# Patient Record
Sex: MALE | Race: OTHER | Employment: FULL TIME | URBAN - METROPOLITAN AREA
[De-identification: names, ages, dates, MRNs, and addresses within clinical notes are randomized per-mention and may not be internally consistent; named-entity substitution may affect disease eponyms.]

---

## 2017-09-21 ENCOUNTER — HOSPITAL ENCOUNTER (EMERGENCY)
Facility: MEDICAL CENTER | Age: 22
End: 2017-09-21
Attending: EMERGENCY MEDICINE
Payer: MEDICAID

## 2017-09-21 VITALS
SYSTOLIC BLOOD PRESSURE: 131 MMHG | DIASTOLIC BLOOD PRESSURE: 84 MMHG | HEIGHT: 60 IN | WEIGHT: 188.49 LBS | RESPIRATION RATE: 16 BRPM | TEMPERATURE: 97.4 F | OXYGEN SATURATION: 96 % | HEART RATE: 60 BPM | BODY MASS INDEX: 37.01 KG/M2

## 2017-09-21 DIAGNOSIS — T16.2XXA EAR FOREIGN BODY, LEFT, INITIAL ENCOUNTER: ICD-10-CM

## 2017-09-21 PROCEDURE — 69200 CLEAR OUTER EAR CANAL: CPT

## 2017-09-21 PROCEDURE — 99283 EMERGENCY DEPT VISIT LOW MDM: CPT

## 2017-09-21 ASSESSMENT — PAIN SCALES - GENERAL: PAINLEVEL_OUTOF10: 0

## 2017-09-22 NOTE — DISCHARGE INSTRUCTIONS
Ear Foreign Body  An ear foreign body is an object that is stuck in your ear. The object is usually stuck in the ear canal.  CAUSES  In all ages of people, the most common foreign bodies are insects that enter the ear canal. It is common for young children to put objects into the ear canal. These may include luisana, beads, parts of toys, and any other small objects that fit into the ear. In adults, objects such as cotton swabs may become lodged in the ear canal.   SIGNS AND SYMPTOMS  A foreign body in the ear may cause:  · Pain.  · Buzzing or roaring sounds.  · Hearing loss.  · Ear drainage or bleeding.  · Nausea and vomiting.  · A feeling that your ear is full.  DIAGNOSIS  Your health care provider may be able to diagnose an ear foreign body based on the information that you provide, your symptoms, and a physical exam. Your health care provider may also perform tests, such as testing your hearing and your ear pressure, to check for infection or other problems that are caused by the foreign body in your ear.  TREATMENT  Treatment depends on what the foreign body is, the location of the foreign body in your ear, and whether or not the foreign body has injured any part of your inner ear. If the foreign body is visible to your health care provider, it may be possible to remove the foreign body using:  · A tool, such as medical tweezers (forceps) or a suction tube (catheter).  · Irrigation. This uses water to flush the foreign body out of your ear. This is used only if the foreign body is not likely to swell or enlarge when it is put in water.  If the foreign body is not visible or your health care provider was not able to remove the foreign body, you may be referred to a specialist for removal. You may also be prescribed antibiotic medicine or ear drops to prevent infection. If the foreign body has caused injury to other parts of your ear, you may need additional treatment.  HOME CARE INSTRUCTIONS  · Keep all  follow-up visits as directed by your health care provider. This is important.  · Take medicines only as directed by your health care provider.  · If you were prescribed an antibiotic medicine, finish it all even if you start to feel better.  PREVENTION  · Keep small objects out of reach of young children. Tell children not to put anything in their ears.  · Do not put anything in your ear, including cotton swabs, to clean your ears. Talk to your health care provider about how to clean your ears safely.  SEEK MEDICAL CARE IF:  · You have a headache.  · Your have blood coming from your ear.  · You have a fever.  · You have increased pain or swelling of your ear.  · Your hearing is reduced.  · You have discharge coming from your ear.     This information is not intended to replace advice given to you by your health care provider. Make sure you discuss any questions you have with your health care provider.     Document Released: 12/15/2001 Document Revised: 01/08/2016 Document Reviewed: 08/03/2015  [email or web address] Interactive Patient Education ©2016 [employer]

## 2017-09-22 NOTE — ED PROVIDER NOTES
ED Provider Note    CHIEF COMPLAINT  Chief Complaint   Patient presents with   • Foreign Body in Ear       HPI  Magdaleno Bear is a 22 y.o. Male, otherwise healthy, who presents with foreign body in the left ear.  Patient states he was cleaning his ears with Q-tips just prior to arrival when he believes part or all of the tip became lodged in his ear canal. He reports some associated discomfort without significant pain. No recent fevers or infectious symptoms. No discharge or bleeding from the ear. No other complaints at this time.      REVIEW OF SYSTEMS  See HPI for further details. All other systems are negative.     PAST MEDICAL HISTORY   has a past medical history of Fracture.    SOCIAL HISTORY  Social History     Social History Main Topics   • Smoking status: Never Smoker   • Smokeless tobacco: Not on file   • Alcohol use No   • Drug use: No   • Sexual activity: Not on file       SURGICAL HISTORY   has a past surgical history that includes tibia orif (3/28/2011).    CURRENT MEDICATIONS  Home Medications    **Home medications have not yet been reviewed for this encounter**         ALLERGIES  No Known Allergies    PHYSICAL EXAM  VITAL SIGNS: /84   Pulse 60   Temp 36.3 °C (97.4 °F)   Resp 16   Ht 1.524 m (5')   Wt 85.5 kg (188 lb 7.9 oz)   SpO2 96%   BMI 36.81 kg/m²    Pulse ox interpretation: I interpret this pulse ox as normal.  Constitutional: Alert in no apparent distress.  HENT: Normocephalic, Atraumatic, Bilateral external ears normal. Nose normal.   Right TM visualized and normal, left TM with obvious foreign body blocking visualization of the TM, no obvious trauma or bleeding  Eyes: Pupils are equal and reactive. Conjunctiva normal, non-icteric.   Heart: Regular rate and rhythm, no murmurs.    Lungs: Clear to auscultation bilaterally.  Skin: Warm, Dry, No erythema, No rash.   Neurologic: Alert, Grossly non-focal.   Psychiatric: Affect normal, Judgment normal, Mood normal, Appears appropriate and  not intoxicated.       PROCEDURE:  Foreign Body Removal Procedure Note    Indication: retained foreign body    Procedure:   Foreign body removed from left ear with alligator forceps.  On reexamination, there is no retained foreign body or evidence of bleeding or trauma.    The patient tolerated the procedure well.    Complications: None          ED COURSE & MEDICAL DECISION MAKING    /84   Pulse 60   Temp 36.3 °C (97.4 °F)   Resp 16   Ht 1.524 m (5')   Wt 85.5 kg (188 lb 7.9 oz)   SpO2 96%   BMI 36.81 kg/m²     Medications administered:  Medications - No data to display      Labs and imaging personally reviewed:    Labs Reviewed - No data to display    No orders to display       MDM:  Otherwise healthy patient presents with foreign body to the left ear. Afebrile with normal vitals on arrival. No other medical complaints. Large tip of Qtip removed with alligator forceps without difficulty. No evidence of underlying infection including acute otitis media, otitis externa on exam. No evidence of trauma or TM perforation. Plan for discharge home. Patient instructed on avoidance of Q-tips in the ear.      IMPRESSION  (T16.2XXA) Ear foreign body, left, initial encounter    Disposition: Discharge home, good condition  Results, diagnoses, and treatment options were discussed with the patient and/or family. Patient verbalized understanding of plan of care and strict return precautions prior to discharge.    Discharge Medication List as of 9/21/2017 10:40 PM            Electronically signed by: Megan Orozco, 9/21/2017 10:30 PM

## 2018-06-26 ENCOUNTER — NON-PROVIDER VISIT (OUTPATIENT)
Dept: URGENT CARE | Facility: CLINIC | Age: 23
End: 2018-06-26

## 2018-06-26 DIAGNOSIS — Z02.1 PRE-EMPLOYMENT DRUG SCREENING: ICD-10-CM

## 2018-06-26 LAB
AMP AMPHETAMINE: NORMAL
COC COCAINE: NORMAL
INT CON NEG: NEGATIVE
INT CON POS: POSITIVE
MET METHAMPHETAMINES: NORMAL
OPI OPIATES: NORMAL
PCP PHENCYCLIDINE: NORMAL
POC DRUG COMMENT 753798-OCCUPATIONAL HEALTH: NEGATIVE
THC: NORMAL

## 2018-06-26 PROCEDURE — 80305 DRUG TEST PRSMV DIR OPT OBS: CPT | Performed by: NURSE PRACTITIONER

## 2020-06-25 ENCOUNTER — NON-PROVIDER VISIT (OUTPATIENT)
Dept: URGENT CARE | Facility: PHYSICIAN GROUP | Age: 25
End: 2020-06-25

## 2020-06-25 DIAGNOSIS — Z02.1 PRE-EMPLOYMENT DRUG SCREENING: ICD-10-CM

## 2020-06-25 LAB
AMP AMPHETAMINE: NEGATIVE
COC COCAINE: NEGATIVE
INT CON NEG: NORMAL
INT CON POS: NORMAL
MET METHAMPHETAMINES: NEGATIVE
OPI OPIATES: NEGATIVE
PCP PHENCYCLIDINE: NEGATIVE
POC DRUG COMMENT 753798-OCCUPATIONAL HEALTH: NEGATIVE
THC: NEGATIVE

## 2020-06-25 PROCEDURE — 80305 DRUG TEST PRSMV DIR OPT OBS: CPT | Performed by: PHYSICIAN ASSISTANT

## 2022-09-13 ENCOUNTER — OFFICE VISIT (OUTPATIENT)
Dept: URGENT CARE | Facility: PHYSICIAN GROUP | Age: 27
End: 2022-09-13
Payer: COMMERCIAL

## 2022-09-13 VITALS
SYSTOLIC BLOOD PRESSURE: 132 MMHG | TEMPERATURE: 99.1 F | BODY MASS INDEX: 40.52 KG/M2 | HEART RATE: 91 BPM | WEIGHT: 220.2 LBS | RESPIRATION RATE: 20 BRPM | DIASTOLIC BLOOD PRESSURE: 96 MMHG | OXYGEN SATURATION: 97 % | HEIGHT: 62 IN

## 2022-09-13 DIAGNOSIS — S76.219A INGUINAL STRAIN, INITIAL ENCOUNTER: ICD-10-CM

## 2022-09-13 LAB
APPEARANCE UR: CLEAR
BILIRUB UR STRIP-MCNC: NEGATIVE MG/DL
COLOR UR AUTO: YELLOW
GLUCOSE UR STRIP.AUTO-MCNC: NEGATIVE MG/DL
KETONES UR STRIP.AUTO-MCNC: NEGATIVE MG/DL
LEUKOCYTE ESTERASE UR QL STRIP.AUTO: NEGATIVE
NITRITE UR QL STRIP.AUTO: NEGATIVE
PH UR STRIP.AUTO: 6.5 [PH] (ref 5–8)
PROT UR QL STRIP: NEGATIVE MG/DL
RBC UR QL AUTO: NEGATIVE
SP GR UR STRIP.AUTO: 1.02
UROBILINOGEN UR STRIP-MCNC: 0.2 MG/DL

## 2022-09-13 PROCEDURE — 99203 OFFICE O/P NEW LOW 30 MIN: CPT | Performed by: PHYSICIAN ASSISTANT

## 2022-09-13 PROCEDURE — 81002 URINALYSIS NONAUTO W/O SCOPE: CPT | Performed by: PHYSICIAN ASSISTANT

## 2022-09-13 ASSESSMENT — ENCOUNTER SYMPTOMS
HEADACHES: 0
MYALGIAS: 0
DIZZINESS: 0
CHILLS: 0
FEVER: 0
TINGLING: 0
ABDOMINAL PAIN: 0
FLANK PAIN: 0
FALLS: 0
NAUSEA: 0
VOMITING: 0

## 2022-09-13 NOTE — PROGRESS NOTES
Subjective:     CHIEF COMPLAINT  Chief Complaint   Patient presents with    Leg Pain     Pain at top of left leg, swollen, pain is increasing, started this morning, no known injury       HPI  Magdaleno Bear is a very pleasant 27 y.o. male who presents to the clinic with left-sided inguinal pain x3 hours.  Patient states the pain started abruptly.  Denies any preceding injury or trauma.  Pain is currently fairly mild rated as a 3/10.  Certain movements seem to aggravate the pain.  The pain does not radiate.  He denies any associated testicular pain, scrotal swelling, dysuria, penile discharge, rash or lesion.  States he did workout yesterday however denies any new or strenuous activities.  He has concern for potential hernia.  No prior hernia or abdominal surgery.  States he is having normal bowel movements.  No nausea, vomiting, fevers or chills.  He is not sexually active.  Started any OTC medications at this time for symptoms.    REVIEW OF SYSTEMS  Review of Systems   Constitutional:  Negative for chills, fever and malaise/fatigue.   Gastrointestinal:  Negative for abdominal pain, nausea and vomiting.   Genitourinary:  Negative for dysuria, flank pain, frequency, hematuria and urgency.        Left-sided groin pain.  No testicular pain or swelling.   Musculoskeletal:  Negative for falls and myalgias.   Skin:  Negative for rash.   Neurological:  Negative for dizziness, tingling and headaches.     PAST MEDICAL HISTORY  There are no problems to display for this patient.      SURGICAL HISTORY   has a past surgical history that includes orif, fracture, tibia (3/28/2011).    ALLERGIES  No Known Allergies    CURRENT MEDICATIONS  Home Medications       Reviewed by Mikel De Santiago P.A.-C. (Physician Assistant) on 09/13/22 at 1319  Med List Status: <None>     Medication Last Dose Status   acetaminophen (TYLENOL) 325 MG Tab Not Taking Active   diphenhydrAMINE (BENADRYL) 25 MG capsule Not Taking Active   hydrocodone-acetaminophen  "(VICODIN) 5-500 MG Tab Not Taking Active   sennosides (SENOKOT) 8.6 MG Tab Not Taking Active                    SOCIAL HISTORY  Social History     Tobacco Use    Smoking status: Never    Smokeless tobacco: Never   Vaping Use    Vaping Use: Never used   Substance and Sexual Activity    Alcohol use: No    Drug use: No    Sexual activity: Not on file       FAMILY HISTORY  History reviewed. No pertinent family history.       Objective:     VITAL SIGNS: BP (!) 132/96 (BP Location: Left arm, Patient Position: Sitting, BP Cuff Size: Adult)   Pulse 91   Temp 37.3 °C (99.1 °F) (Temporal)   Resp 20   Ht 1.575 m (5' 2\")   Wt 99.9 kg (220 lb 3.2 oz)   SpO2 97%   BMI 40.28 kg/m²     PHYSICAL EXAM  Physical Exam  Constitutional:       Appearance: Normal appearance.   HENT:      Head: Normocephalic and atraumatic.   Eyes:      Conjunctiva/sclera: Conjunctivae normal.   Pulmonary:      Effort: Pulmonary effort is normal.   Abdominal:      Hernia: There is no hernia in the left inguinal area or right inguinal area.   Genitourinary:     Pubic Area: No rash.       Penis: Normal.       Testes: Normal. Cremasteric reflex is present.         Right: Mass or tenderness not present.         Left: Mass or tenderness not present.      Epididymis:      Right: Normal.      Left: Normal.   Musculoskeletal:      Cervical back: Normal range of motion.   Lymphadenopathy:      Lower Body: No right inguinal adenopathy. No left inguinal adenopathy.   Skin:     Capillary Refill: Capillary refill takes less than 2 seconds.   Neurological:      General: No focal deficit present.      Mental Status: He is alert and oriented to person, place, and time. Mental status is at baseline.       Assessment/Plan:     1. Inguinal strain, initial encounter    - POCT Urinalysis  - US-INGUINAL HERNIA; Future    MDM/Comments:    Differential diagnoses and treatment options eliu with patient at length today.  Patient has been experiencing left inguinal pain x3 " hours.  Pain is fairly mild at this time.  Aggravated with certain movements.  Denies any preceding injury or trauma.  No concern for STI.  Urinalysis performed without any signs of infection or blood.  No palpable hernia appreciated.  Discussed possible inguinal hernia versus muscle strain.  No signs of testicular torsion.  We will send out for outpatient imaging.  I will follow-up once available.  Supportive recommendations discussed.    Differential diagnosis, natural history, supportive care, and indications for immediate follow-up discussed. All questions answered. Patient agrees with the plan of care.    Follow-up as needed if symptoms worsen or fail to improve to PCP, Urgent care or Emergency Room.    I have personally reviewed prior external notes and test results pertinent to today's visit.  I have independently reviewed and interpreted all diagnostics ordered during this urgent care acute visit.   Discussed management options (risks,benefits, and alternatives to treatment). Pt expresses understanding and the treatment plan was agreed upon. Questions were encouraged and answered to pt's satisfaction.    Please note that this dictation was created using voice recognition software. I have made a reasonable attempt to correct obvious errors, but I expect that there are errors of grammar and possibly content that I did not discover before finalizing the note.

## 2022-09-13 NOTE — LETTER
[facility] [place]  [address]  [place] [address]     September 13, 2022    Patient: Magdaleno Bear   YOB: 1995   Date of Visit: 9/13/2022       To Whom It May Concern:    Magdaleno Bear was seen and treated in our department on 9/13/2022.  Please excuse the patient's absence from work on 9/13/2022.  Cleared to return to work on 9/14/2022.    Sincerely,     Mikel De Santiago P.A.-C.

## 2022-09-15 ENCOUNTER — HOSPITAL ENCOUNTER (OUTPATIENT)
Dept: RADIOLOGY | Facility: MEDICAL CENTER | Age: 27
End: 2022-09-15
Attending: PHYSICIAN ASSISTANT
Payer: COMMERCIAL

## 2022-09-15 DIAGNOSIS — S76.219A INGUINAL STRAIN, INITIAL ENCOUNTER: ICD-10-CM

## 2022-09-15 PROCEDURE — 76857 US EXAM PELVIC LIMITED: CPT

## 2022-09-16 ENCOUNTER — TELEPHONE (OUTPATIENT)
Dept: URGENT CARE | Facility: CLINIC | Age: 27
End: 2022-09-16
Payer: COMMERCIAL

## 2022-09-16 NOTE — TELEPHONE ENCOUNTER
Tried calling the patient multiple times to discuss his ultrasound findings.  Left voicemail for the patient to give me a call back when he has availability and I am happy to discuss his results.

## 2025-08-09 ENCOUNTER — OCCUPATIONAL MEDICINE (OUTPATIENT)
Dept: URGENT CARE | Facility: CLINIC | Age: 30
End: 2025-08-09
Payer: COMMERCIAL

## 2025-08-09 VITALS
RESPIRATION RATE: 18 BRPM | BODY MASS INDEX: 41.11 KG/M2 | HEART RATE: 64 BPM | DIASTOLIC BLOOD PRESSURE: 74 MMHG | OXYGEN SATURATION: 96 % | WEIGHT: 232 LBS | SYSTOLIC BLOOD PRESSURE: 118 MMHG | TEMPERATURE: 97.8 F | HEIGHT: 63 IN

## 2025-08-09 DIAGNOSIS — S00.03XA CONTUSION OF SCALP, INITIAL ENCOUNTER: ICD-10-CM

## 2025-08-09 DIAGNOSIS — S09.90XA CLOSED HEAD INJURY, INITIAL ENCOUNTER: Primary | ICD-10-CM

## 2025-08-09 PROCEDURE — 3074F SYST BP LT 130 MM HG: CPT | Performed by: STUDENT IN AN ORGANIZED HEALTH CARE EDUCATION/TRAINING PROGRAM

## 2025-08-09 PROCEDURE — 99214 OFFICE O/P EST MOD 30 MIN: CPT | Mod: 25 | Performed by: STUDENT IN AN ORGANIZED HEALTH CARE EDUCATION/TRAINING PROGRAM

## 2025-08-09 PROCEDURE — 3078F DIAST BP <80 MM HG: CPT | Performed by: STUDENT IN AN ORGANIZED HEALTH CARE EDUCATION/TRAINING PROGRAM

## 2025-08-09 RX ORDER — KETOROLAC TROMETHAMINE 15 MG/ML
15 INJECTION, SOLUTION INTRAMUSCULAR; INTRAVENOUS ONCE
Status: COMPLETED | OUTPATIENT
Start: 2025-08-09 | End: 2025-08-09

## 2025-08-09 RX ADMIN — KETOROLAC TROMETHAMINE 15 MG: 15 INJECTION, SOLUTION INTRAMUSCULAR; INTRAVENOUS at 19:43

## 2025-08-11 ENCOUNTER — OCCUPATIONAL MEDICINE (OUTPATIENT)
Dept: URGENT CARE | Facility: CLINIC | Age: 30
End: 2025-08-11
Payer: COMMERCIAL

## 2025-08-11 VITALS
OXYGEN SATURATION: 97 % | TEMPERATURE: 98.5 F | HEIGHT: 63 IN | DIASTOLIC BLOOD PRESSURE: 68 MMHG | SYSTOLIC BLOOD PRESSURE: 120 MMHG | HEART RATE: 81 BPM | WEIGHT: 232 LBS | BODY MASS INDEX: 41.11 KG/M2 | RESPIRATION RATE: 18 BRPM

## 2025-08-11 DIAGNOSIS — S09.90XD CHI (CLOSED HEAD INJURY), SUBSEQUENT ENCOUNTER: Primary | ICD-10-CM

## 2025-08-11 PROCEDURE — 99213 OFFICE O/P EST LOW 20 MIN: CPT | Performed by: STUDENT IN AN ORGANIZED HEALTH CARE EDUCATION/TRAINING PROGRAM

## 2025-08-11 PROCEDURE — 3078F DIAST BP <80 MM HG: CPT | Performed by: STUDENT IN AN ORGANIZED HEALTH CARE EDUCATION/TRAINING PROGRAM

## 2025-08-11 PROCEDURE — 3074F SYST BP LT 130 MM HG: CPT | Performed by: STUDENT IN AN ORGANIZED HEALTH CARE EDUCATION/TRAINING PROGRAM

## 2025-08-14 ENCOUNTER — OCCUPATIONAL MEDICINE (OUTPATIENT)
Dept: URGENT CARE | Facility: CLINIC | Age: 30
End: 2025-08-14
Payer: COMMERCIAL

## 2025-08-14 VITALS
TEMPERATURE: 97.5 F | WEIGHT: 232 LBS | HEART RATE: 81 BPM | SYSTOLIC BLOOD PRESSURE: 126 MMHG | RESPIRATION RATE: 18 BRPM | BODY MASS INDEX: 41.11 KG/M2 | DIASTOLIC BLOOD PRESSURE: 88 MMHG | HEIGHT: 63 IN | OXYGEN SATURATION: 95 %

## 2025-08-14 DIAGNOSIS — S09.90XD CLOSED HEAD INJURY, SUBSEQUENT ENCOUNTER: Primary | ICD-10-CM

## 2025-08-14 DIAGNOSIS — S06.0X0D CONCUSSION WITHOUT LOSS OF CONSCIOUSNESS, SUBSEQUENT ENCOUNTER: ICD-10-CM

## 2025-08-14 DIAGNOSIS — M54.2 NECK PAIN: ICD-10-CM

## 2025-08-14 DIAGNOSIS — R51.9 ACUTE NONINTRACTABLE HEADACHE, UNSPECIFIED HEADACHE TYPE: ICD-10-CM

## 2025-08-14 PROCEDURE — 3079F DIAST BP 80-89 MM HG: CPT | Performed by: NURSE PRACTITIONER

## 2025-08-14 PROCEDURE — 3074F SYST BP LT 130 MM HG: CPT | Performed by: NURSE PRACTITIONER

## 2025-08-14 PROCEDURE — 99214 OFFICE O/P EST MOD 30 MIN: CPT | Performed by: NURSE PRACTITIONER

## 2025-08-14 RX ORDER — CYCLOBENZAPRINE HCL 5 MG
5 TABLET ORAL 3 TIMES DAILY PRN
Qty: 30 TABLET | Refills: 0 | Status: SHIPPED | OUTPATIENT
Start: 2025-08-14

## 2025-08-14 ASSESSMENT — ENCOUNTER SYMPTOMS
CHILLS: 0
LOSS OF CONSCIOUSNESS: 0
VOMITING: 0
NECK PAIN: 1
EYE REDNESS: 0
HEADACHES: 1
SORE THROAT: 0
SHORTNESS OF BREATH: 0
NAUSEA: 0
FEVER: 0
MYALGIAS: 0
DIZZINESS: 0

## 2025-08-15 ENCOUNTER — TELEPHONE (OUTPATIENT)
Dept: URGENT CARE | Facility: CLINIC | Age: 30
End: 2025-08-15
Payer: COMMERCIAL

## 2025-08-15 ENCOUNTER — HOSPITAL ENCOUNTER (OUTPATIENT)
Dept: RADIOLOGY | Facility: MEDICAL CENTER | Age: 30
End: 2025-08-15
Attending: NURSE PRACTITIONER
Payer: COMMERCIAL

## 2025-08-15 ENCOUNTER — TELEPHONE (OUTPATIENT)
Dept: OCCUPATIONAL MEDICINE | Facility: CLINIC | Age: 30
End: 2025-08-15

## 2025-08-15 DIAGNOSIS — S09.90XD CLOSED HEAD INJURY, SUBSEQUENT ENCOUNTER: ICD-10-CM

## 2025-08-15 DIAGNOSIS — R51.9 ACUTE NONINTRACTABLE HEADACHE, UNSPECIFIED HEADACHE TYPE: ICD-10-CM

## 2025-08-15 DIAGNOSIS — S06.0X0D CONCUSSION WITHOUT LOSS OF CONSCIOUSNESS, SUBSEQUENT ENCOUNTER: ICD-10-CM

## 2025-08-15 PROCEDURE — 70450 CT HEAD/BRAIN W/O DYE: CPT

## 2025-08-18 ENCOUNTER — OCCUPATIONAL MEDICINE (OUTPATIENT)
Dept: OCCUPATIONAL MEDICINE | Facility: CLINIC | Age: 30
End: 2025-08-18
Payer: COMMERCIAL

## 2025-08-18 VITALS
BODY MASS INDEX: 43.79 KG/M2 | DIASTOLIC BLOOD PRESSURE: 80 MMHG | SYSTOLIC BLOOD PRESSURE: 118 MMHG | HEART RATE: 106 BPM | OXYGEN SATURATION: 96 % | TEMPERATURE: 98.6 F | WEIGHT: 238 LBS | HEIGHT: 62 IN

## 2025-08-18 DIAGNOSIS — S46.912D STRAIN OF LEFT SHOULDER, SUBSEQUENT ENCOUNTER: ICD-10-CM

## 2025-08-18 DIAGNOSIS — M54.2 NECK PAIN: ICD-10-CM

## 2025-08-18 DIAGNOSIS — R51.9 ACUTE NONINTRACTABLE HEADACHE, UNSPECIFIED HEADACHE TYPE: ICD-10-CM

## 2025-08-18 DIAGNOSIS — S06.0X0D CONCUSSION WITHOUT LOSS OF CONSCIOUSNESS, SUBSEQUENT ENCOUNTER: ICD-10-CM

## 2025-08-18 DIAGNOSIS — S09.90XD CLOSED HEAD INJURY, SUBSEQUENT ENCOUNTER: Primary | ICD-10-CM
